# Patient Record
Sex: MALE | Race: AMERICAN INDIAN OR ALASKA NATIVE
[De-identification: names, ages, dates, MRNs, and addresses within clinical notes are randomized per-mention and may not be internally consistent; named-entity substitution may affect disease eponyms.]

---

## 2019-04-30 ENCOUNTER — HOSPITAL ENCOUNTER (EMERGENCY)
Dept: HOSPITAL 43 - DL.ED | Age: 34
Discharge: HOME | End: 2019-04-30
Payer: MEDICAID
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
## 2019-04-30 DIAGNOSIS — S60.221A: Primary | ICD-10-CM

## 2019-04-30 NOTE — EDM.PDOC
ED HPI GENERAL MEDICAL PROBLEM





- General


Chief Complaint: Upper Extremity Injury/Pain


Stated Complaint: DISLOCATED FINGERS RT HAND [id number]


Time Seen by Provider: 04/30/19 23:10


Source of Information: Reports: Patient


History Limitations: Reports: No Limitations





- History of Present Illness


INITIAL COMMENTS - FREE TEXT/NARRATIVE: 





C/o right hand pain  reports bumped hand on pole at work on Monday, felt like 

it dislocated or broke. Has not been seen prior to tonight .  [employer]played at [employer]





- Related Data


 Allergies











Allergy/AdvReac Type Severity Reaction Status Date / Time


 


No Known Allergies Allergy   Verified 04/30/19 22:50











Home Meds: 


 Home Meds





. [No Known Home Meds]  04/30/19 [History]











Past Medical History





- Past Surgical History


GI Surgical History: Reports: Appendectomy


Musculoskeletal Surgical History: Reports: Other (See Below)


Other Musculoskeletal Surgeries/Procedures:: hand surgery





Social & Family History





- Tobacco Use


Smoking Status *Q: Never Smoker


Second Hand Smoke Exposure: No





- Recreational Drug Use


Recreational Drug Use: Yes


Drug Use in Last 12 Months: Yes


Recreational Drug Type: Reports: Marijuana/Hashish





Review of Systems





- Review of Systems


Review Of Systems: See Below


Constitutional: Reports: No Symptoms


Eyes: Reports: No Symptoms


Ears: Reports: No Symptoms


Nose: Reports: No Symptoms


Mouth/Throat: Reports: No Symptoms


Respiratory: Reports: No Symptoms


GI/Abdominal: Reports: No Symptoms


Musculoskeletal: Reports: Hand Pain (right)


Skin: Reports: No Symptoms


Neurological: Reports: No Symptoms





ED EXAM, GENERAL





- Physical Exam


Exam: See Below


Exam Limited By: No Limitations


General Appearance: Alert, Anxious


Eye Exam: Bilateral Eye: PERRL


Ears: Normal External Exam, Hearing Grossly Normal


Nose: Normal Inspection


Throat/Mouth: Normal Inspection


Head: Atraumatic, Normocephalic


Neck: Normal Inspection


Respiratory/Chest: No Respiratory Distress


Cardiovascular: Normal Peripheral Pulses, Regular Rate, Rhythm


Extremities: Other (right hand mild generalized sweling anterior, no redness no 

bruising generalized tenderness greater 4th and 5th MCP)


Neurological: Alert, Oriented, Inattentive


Psychiatric: Anxious (rapid speech, )


Skin Exam: Warm, Dry, Other (multiple scattered small 3-5 mm abrasion ot palm 

fingers and MCP and hand. )





Course





- Vital Signs


Last Recorded V/S: 


 Last Vital Signs











Temp  97.2 F   04/30/19 22:47


 


Pulse  116 H  04/30/19 22:47


 


Resp  18   04/30/19 22:47


 


BP  153/83 H  04/30/19 22:47


 


Pulse Ox  99   04/30/19 22:47














- Radiology Interpretation


Free Text/Narrative:: 





Xray right hand negative





Departure





- Departure


Time of Disposition: 23:17


Disposition: Home, Self-Care 01


Condition: Good


Clinical Impression: 


Contusion of right hand


Qualifiers:


 Encounter type: initial encounter Qualified Code(s): S60.221A - Contusion of 

right hand, initial encounter








- Discharge Information


*PRESCRIPTION DRUG MONITORING PROGRAM REVIEWED*: No


*COPY OF PRESCRIPTION DRUG MONITORING REPORT IN PATIENT CRESENCIO: No


Instructions:  Hand Contusion, Easy-to-Read


Referrals: 


PCP,None [Primary Care Provider] - 


Forms:  ED Department Discharge


Additional Instructions: 


elevate


ace wrap for comfort


alternate tylenol and ibuprofen for discomfort every 4 hours as needed


follow up if symptoms worsen

## 2019-09-09 NOTE — PCM.HP
H&P History of Present Illness





- General


Date of Service: 09/09/19


Admit Problem/Dx: 


 Admission Diagnosis/Problem





Admission Diagnosis/Problem      Cellulitis and abscess of face








Source of Information: Patient, Provider





- History of Present Illness


Initial Comments - Free Text/Narative: 





34-year-old with history of pre-diabetes


He is using methamphetamine recreationally, daily marijuana.


He is not working, staying at home with his 4-year-old daughter.





He developed left sided facial swelling associated with drainage from in front 

of the left ureter. There is moderate to severe pain associated with this. 

Worse with touching. Associated redness.





Did not measure her temperature but has a general "ill" feeling.





No chest pain, no abdominal pain.





Last amphetamine use was the day before admission.











- Related Data


Allergies/Adverse Reactions: 


 Allergies











Allergy/AdvReac Type Severity Reaction Status Date / Time


 


No Known Allergies Allergy   Verified 04/30/19 22:50











Home Medications: 


 Home Meds





. [No Known Home Meds]  04/30/19 [History]











Past Medical History


HEENT History: Reports: Hard of Hearing, Impaired Vision


Endocrine/Metabolic History: Reports: Obesity/BMI 30+





- Past Surgical History


GI Surgical History: Reports: Appendectomy


Musculoskeletal Surgical History: Reports: Other (See Below)


Other Musculoskeletal Surgeries/Procedures:: hand surgery





Social & Family History





- Family History


Family Medical History: Noncontributory





- Caffeine Use


Caffeine Use: Reports: Coffee, Soda





- Recreational Drug Use


Recreational Drug Use: Yes


Drug Use in Last 12 Months: Yes


Recreational Drug Type: Reports: Marijuana/Hashish, Methamphetamine





H&P Review of Systems





- Review of Systems:


Review Of Systems: See Below


General: Reports: Chills, Malaise.  Denies: Fever


HEENT: Reports: Other (Left facial swelling and redness between the left eye 

and left ear lobe)


Pulmonary: Denies: Shortness of Breath


Cardiovascular: Denies: Chest Pain, Edema


Musculoskeletal: Reports: No Symptoms


Skin: Reports: Other (Left facial swelling and redness)


Neurological: Reports: Other (No vision change, no double vision).  Denies: 

Confusion





Exam





- Exam


Exam: See Below





- Vital Signs


Vital Signs: 


 Last Vital Signs











Temp  37.9 C   09/09/19 11:45


 


Pulse  114 H  09/09/19 11:45


 


Resp  16   09/09/19 11:45


 


BP  154/90 H  09/09/19 11:45


 


Pulse Ox      











Weight: 100.516 kg





- Exam


General: Alert, Oriented


HEENT: Conjunctiva Clear, EOMI, Hearing Intact, Other (Swelling in the left 

parotid area from the external ear to I)


Lungs: Clear to Auscultation


Cardiovascular: Regular Rate, Regular Rhythm


GI/Abdominal Exam: Normal Bowel Sounds, Soft, Non-Tender


Extremities: No Pedal Edema


Skin: Warm, Other (Swelling and redness of left facial area)


Neuro Extensive - Mental Status: Alert, Oriented x3, Other (Somewhat jittery)


Psychiatric: Alert, Anxious





- Patient Data


Lab Results Last 24 hrs: 


 Laboratory Results - last 24 hr











  09/09/19 09/09/19 09/09/19 Range/Units





  12:08 12:08 12:15 


 


WBC  17.2 H    (5.0-10.0)  10^3/uL


 


RBC  5.20    (4.6-6.2)  10^6/uL


 


Hgb  15.6    (14.0-18.0)  g/dL


 


Hct  44.7    (40.0-54.0)  %


 


MCV  86.0    ()  fL


 


MCH  30.0    (27.0-34.0)  pg


 


MCHC  34.9    (33.0-35.0)  g/dL


 


Plt Count  376    (150-450)  10^3/uL


 


Neut % (Auto)  81.8 H    (42.2-75.2)  %


 


Lymph % (Auto)  9.3 L    (20.5-50.1)  %


 


Mono % (Auto)  6.1    (2-8)  %


 


Eos % (Auto)  2.6    (1.0-3.0)  %


 


Baso % (Auto)  0.2    (0.0-1.0)  %


 


Sodium    134 L  (135-145)  mmol/L


 


Potassium    4.3  (3.6-5.0)  mmol/L


 


Chloride    102  (101-111)  mmol/L


 


Carbon Dioxide    25.0  (21.0-31.0)  mmol/L


 


Anion Gap    11.3  


 


BUN    13  (7-18)  mg/dL


 


Creatinine    0.7  (0.6-1.3)  mg/dL


 


Est Cr Clr Drug Dosing    134.18  mL/min


 


Estimated GFR (MDRD)    > 60  


 


BUN/Creatinine Ratio    18.57  


 


Glucose    99  ()  mg/dL


 


Lactic Acid   1.4   (0.5-2.2)  mmol/L


 


Calcium    8.9  (8.4-10.2)  mg/dl


 


Total Bilirubin    0.4  (0.2-1.0)  mg/dL


 


AST    71 H  (10-42)  IU/L


 


ALT    80 H  (10-60)  IU/L


 


Alkaline Phosphatase    106  ()  IU/L


 


C-Reactive Protein     (0.0-1.3)  mg/dL


 


Total Protein    7.5  (6.7-8.2)  g/dl


 


Albumin    4.0  (3.2-5.5)  g/dl


 


Globulin    3.5  


 


Albumin/Globulin Ratio    1.14  














  09/09/19 Range/Units





  12:15 


 


WBC   (5.0-10.0)  10^3/uL


 


RBC   (4.6-6.2)  10^6/uL


 


Hgb   (14.0-18.0)  g/dL


 


Hct   (40.0-54.0)  %


 


MCV   ()  fL


 


MCH   (27.0-34.0)  pg


 


MCHC   (33.0-35.0)  g/dL


 


Plt Count   (150-450)  10^3/uL


 


Neut % (Auto)   (42.2-75.2)  %


 


Lymph % (Auto)   (20.5-50.1)  %


 


Mono % (Auto)   (2-8)  %


 


Eos % (Auto)   (1.0-3.0)  %


 


Baso % (Auto)   (0.0-1.0)  %


 


Sodium   (135-145)  mmol/L


 


Potassium   (3.6-5.0)  mmol/L


 


Chloride   (101-111)  mmol/L


 


Carbon Dioxide   (21.0-31.0)  mmol/L


 


Anion Gap   


 


BUN   (7-18)  mg/dL


 


Creatinine   (0.6-1.3)  mg/dL


 


Est Cr Clr Drug Dosing   mL/min


 


Estimated GFR (MDRD)   


 


BUN/Creatinine Ratio   


 


Glucose   ()  mg/dL


 


Lactic Acid   (0.5-2.2)  mmol/L


 


Calcium   (8.4-10.2)  mg/dl


 


Total Bilirubin   (0.2-1.0)  mg/dL


 


AST   (10-42)  IU/L


 


ALT   (10-60)  IU/L


 


Alkaline Phosphatase   ()  IU/L


 


C-Reactive Protein  4.1 H  (0.0-1.3)  mg/dL


 


Total Protein   (6.7-8.2)  g/dl


 


Albumin   (3.2-5.5)  g/dl


 


Globulin   


 


Albumin/Globulin Ratio   











Result Diagrams: 


 09/09/19 12:08





 09/09/19 12:15


Imaging Impressions Last 24 hrs: 





CT facial


IMPRESSION:


1. Findings consistent with left periauricular cellulitis as well as left 

parotiditis.


2. There is a left preauricular soft tissue abscess measuring 1.2 x 1.1 cm.


3. Significant chronic maxillary premolar dental disease, could be a 

predisposing risk factor for


parotiditis.








- Problem List


(1) Facial cellulitis


SNOMED Code(s): 914628706


   ICD Code: L03.211 - CELLULITIS OF FACE   Status: Acute   Current Visit: No   


Problem List Initiated/Reviewed/Updated: Yes


Orders Last 24hrs: 


 Active Orders 24 hr











 Category Date Time Status


 


 Admission Diagnosis [ADT] Routine ADT  09/09/19 13:52 Ordered


 


 Patient Status [ADT] Routine ADT  09/09/19 13:52 Active


 


 Oxygen Therapy [RC] PRN Care  09/09/19 14:25 Ordered


 


 Peripheral IV Care [RC] .AS DIRECTED Care  09/09/19 11:58 Active


 


 Up With Assistance [RC] ASDIRECTED Care  09/09/19 14:25 Ordered


 


 VTE/DVT Education [RC] PER UNIT ROUTINE Care  09/09/19 14:25 Ordered


 


 Vital Signs [RC] Q4H Care  09/09/19 14:25 Ordered


 


 Regular Diet [DIET] Diet  09/09/19 Dinner Ordered


 


 BASIC METABOLIC PANEL,BMP [CHEM] AM Lab  09/10/19 05:15 Ordered


 


 CBC WITH AUTO DIFF [HEME] AM Lab  09/10/19 05:15 Ordered


 


 CULTURE BLOOD [BC] Stat Lab  09/09/19 12:08 Received


 


 CULTURE BLOOD [BC] Stat Lab  09/09/19 12:15 Received


 


 CULTURE EAR [RM] Stat Lab  09/09/19 11:55 Received


 


 CULTURE EYE [RM] Stat Lab  09/09/19 11:54 Received


 


 Acetaminophen [Tylenol] Med  09/09/19 14:25 Ordered





 650 mg PO Q4H PRN   


 


 Acetaminophen/oxyCODONE [Percocet 325-5 MG] Med  09/09/19 14:25 Ordered





 1 tab PO Q4H PRN   


 


 Gentamicin [Garamycin 0.3% Ophth Soln] Med  09/09/19 21:00 Ordered





 1 ml EYELF TID   


 


 Heparin Sodium Med  09/09/19 22:00 Ordered





 5,000 units SUBCUT Q8HR   


 


 Ibuprofen [Motrin] Med  09/09/19 14:25 Ordered





 600 mg PO Q6H PRN   


 


 Ondansetron [Zofran ODT] Med  09/09/19 14:25 Ordered





 4 mg PO Q6H PRN   


 


 Ondansetron [Zofran] Med  09/09/19 14:25 Ordered





 4 mg IVPUSH Q6H PRN   


 


 Pharmacy to Dose - Vancomycin Med  09/09/19 14:30 Ordered





 1 dose .XX ASDIRECTED   


 


 Piperacillin/Tazobactam [Zosyn] 3.375 gm Med  09/09/19 19:00 Ordered





 Sodium Chloride 0.9% [Normal Saline] 100 ml   





 IV Q6H   


 


 Sodium Chloride 0.9% [Saline Flush] Med  09/09/19 11:58 Active





 10 ml FLUSH ASDIRECTED PRN   


 


 Zolpidem [Ambien] Med  09/09/19 14:25 Ordered





 5 mg PO BEDTIME PRN   


 


 Blood Culture x2 Reflex Set [OM.PC] Stat Ot  09/09/19 11:58 Ordered


 


 Peripheral IV Insertion Adult [OM.PC] Stat Ot  09/09/19 11:57 Ordered


 


 Resuscitation Status Routine Resus Stat  09/09/19 14:25 Ordered








 Medication Orders





Acetaminophen (Tylenol)  650 mg PO Q4H PRN


   PRN Reason: Pain (Mild 1-3)/fever


Gentamicin Sulfate (Garamycin 0.3% Ophth Soln)  1 ml EYELF TID CHARLES


Heparin Sodium (Porcine) (Heparin Sodium)  5,000 units SUBCUT Q8HR CHARLES


Piperacillin Sod/Tazobactam (Sod 3.375 gm/ Sodium Chloride)  100 mls @ 200 mls/

hr IV Q6HR CHARLES


Ibuprofen (Motrin)  600 mg PO Q6H PRN


   PRN Reason: Pain (moderate 4-6)


Ondansetron HCl (Zofran)  4 mg IVPUSH Q6H PRN


   PRN Reason: Nausea/Vomiting


Ondansetron HCl (Zofran Odt)  4 mg PO Q6H PRN


   PRN Reason: nausea, able to take PO


Oxycodone/Acetaminophen (Percocet 325-5 Mg)  1 tab PO Q4H PRN


   PRN Reason: Pain (severe 7-10)


Sodium Chloride (Saline Flush)  10 ml FLUSH ASDIRECTED PRN


   PRN Reason: Keep Vein Open


   Last Admin: 09/09/19 12:08  Dose: 10 ml


Vancomycin HCl (Pharmacy To Dose - Vancomycin)  1 dose .XX ASDIRECTED CHARLES


Zolpidem Tartrate (Ambien)  5 mg PO BEDTIME PRN


   PRN Reason: Sleep








Assessment/Plan Comment:: 








44-year-old presented with left facial redness, swelling, 1 x 1 cm abscess





Facial cellulitis with small abscess


At this point the abscess probably can be treated with medications


We'll monitor closely for needs surgical drainage


Obtain blood culture, culture from drainage


Treat empirically with vancomycin and Zosyn





History of drug use with amphetamine, marijuana


Follow for potential withdrawal symptoms





History of present diabetes


We'll check fasting sugar in the morning








DVT prophylaxis with subcutaneous heparin

## 2019-09-09 NOTE — EDM.PDOC
ED HPI GENERAL MEDICAL PROBLEM





- General


Chief Complaint: ENT Problem


Stated Complaint: INFECTION


Time Seen by Provider: 19 11:57


Source of Information: Reports: Patient, RN, RN Notes Reviewed


History Limitations: Reports: No Limitations





- History of Present Illness


INITIAL COMMENTS - FREE TEXT/NARRATIVE: 


Pt presents to ER from home with c/o waking with swelling and pain to the left 

face from the eye to the ear. Pt states he was seen in clinic and given 

Erythromycin eye ointment for an eye infection but it did not improve. This 

morning when he woke he notice purulent drainage from the skin just in front of 

the left ear, increased eye pain and swelling, and redness with swelling of the 

left external ear and area around the ear. Pt isn't sure if he has had any 

fevers or not. He admits to feeling a generalized "ill" feeling. Pt does not 

know if he has ever had a MRSA infection or not.





Onset: Gradual


Duration: Constant, Getting Worse


Location: Reports: Face


Quality: Reports: Ache, Pressure, Throbbing


Severity: Moderate


Improves with: Reports: None


Worsens with: Reports: None


Associated Symptoms: Reports: No Other Symptoms


Treatments PTA: Reports: Other Medication(s) (Erythromycin Ophthalmic Ointment)





- Related Data


 Allergies











Allergy/AdvReac Type Severity Reaction Status Date / Time


 


No Known Allergies Allergy   Verified 19 22:50











Home Meds: 


 Home Meds





. [No Known Home Meds]  19 [History]











Past Medical History


HEENT History: Reports: Hard of Hearing, Impaired Vision


Endocrine/Metabolic History: Reports: Obesity/BMI 30+





- Past Surgical History


GI Surgical History: Reports: Appendectomy


Musculoskeletal Surgical History: Reports: Other (See Below)


Other Musculoskeletal Surgeries/Procedures:: hand surgery





Social & Family History





- Family History


Family Medical History: Noncontributory





ED ROS ENT





- Review of Systems


Review Of Systems: ROS reveals no pertinent complaints other than HPI.





ED EXAM, ENT





- Physical Exam


Exam: See Below


Exam Limited By: No Limitations


General Appearance: Alert, No Apparent Distress, Obese


Eye Exam: Left Eye: Conjunctival Injection, Periorbital Changes (mild swelling/

inflammation with erythema), Bilateral Eye: EOMI, Normal Fundi, PERRL


Ears: Hearing Loss (Chronic/Stable per pt.), Auricular Erythema (Left), 

Auricular Tenderness (and Pre-auricular with swelling, skin/soft tissue abscess 

with purulent drainage), Canal Swelling (Left).  No: Mastoid Swelling, Mastoid 

Tenderness


Nose: No Blood, Other (Excoriation/scabbing at B/L nares, suspicious for MRSA)


Mouth/Throat: Normal Inspection, Normal Lips, Normal Oropharynx


Head: Atraumatic, Facial Swelling (Left face from left eye to left ear, 

swelling is confined to the upper face, left side only), Facial Tenderness (

with erythema)


Neck: Supple, Non-Tender, Full Range of Motion, Lymphadenopathy (L), Other (No 

nuchal rigidity).  No: Lymphadenopathy (R)


Respiratory/Chest: No Respiratory Distress, Lungs Clear, Normal Breath Sounds, 

No Accessory Muscle Use, Chest Non-Tender


Cardiovascular: Regular Rate, Rhythm, Tachycardia


Extremities: Normal Inspection, Normal Range of Motion, No Pedal Edema.  No: 

Joint Swelling, Increased Warmth, Redness


Neurological: Alert, Oriented, CN II-XII Intact, Normal Cognition, No Motor/

Sensory Deficits


Psychiatric: Normal Mood





Course





- Vital Signs


Last Recorded V/S: 


 Last Vital Signs











Temp  100.2 F   19 11:45


 


Pulse  114 H  19 11:45


 


Resp  16   19 11:45


 


BP  154/90 H  19 11:45


 


Pulse Ox      














- Orders/Labs/Meds


Orders: 


 Active Orders 24 hr











 Category Date Time Status


 


 Peripheral IV Care [RC] .AS DIRECTED Care  19 11:58 Active


 


 CULTURE BLOOD [BC] Stat Lab  19 12:08 Received


 


 CULTURE BLOOD [BC] Stat Lab  19 12:15 Received


 


 CULTURE EAR [RM] Stat Lab  19 11:55 Received


 


 CULTURE EYE [RM] Stat Lab  19 11:54 Received


 


 Piperacillin/Tazobactam [Zosyn] 3.375 gm Med  19 13:35 Active





 Sodium Chloride 0.9% [Normal Saline] 100 ml   





 IV ONETIME   


 


 Sodium Chloride 0.9% [Saline Flush] Med  19 11:58 Active





 10 ml FLUSH ASDIRECTED PRN   


 


 Blood Culture x2 Reflex Set [OM.PC] Stat Oth  19 11:58 Ordered


 


 Peripheral IV Insertion Adult [OM.PC] Stat Oth  19 11:57 Ordered








 Medication Orders





Piperacillin Sod/Tazobactam (Sod 3.375 gm/ Sodium Chloride)  100 mls @ 200 mls/

hr IV ONETIME ONE


   Stop: 19 14:04


Sodium Chloride (Saline Flush)  10 ml FLUSH ASDIRECTED PRN


   PRN Reason: Keep Vein Open


   Last Admin: 19 12:08  Dose: 10 ml








Labs: 


 Laboratory Tests











  19 Range/Units





  12:08 12:08 12:15 


 


WBC  17.2 H    (5.0-10.0)  10^3/uL


 


RBC  5.20    (4.6-6.2)  10^6/uL


 


Hgb  15.6    (14.0-18.0)  g/dL


 


Hct  44.7    (40.0-54.0)  %


 


MCV  86.0    ()  fL


 


MCH  30.0    (27.0-34.0)  pg


 


MCHC  34.9    (33.0-35.0)  g/dL


 


Plt Count  376    (150-450)  10^3/uL


 


Neut % (Auto)  81.8 H    (42.2-75.2)  %


 


Lymph % (Auto)  9.3 L    (20.5-50.1)  %


 


Mono % (Auto)  6.1    (2-8)  %


 


Eos % (Auto)  2.6    (1.0-3.0)  %


 


Baso % (Auto)  0.2    (0.0-1.0)  %


 


Sodium    134 L  (135-145)  mmol/L


 


Potassium    4.3  (3.6-5.0)  mmol/L


 


Chloride    102  (101-111)  mmol/L


 


Carbon Dioxide    25.0  (21.0-31.0)  mmol/L


 


Anion Gap    11.3  


 


BUN    13  (7-18)  mg/dL


 


Creatinine    0.7  (0.6-1.3)  mg/dL


 


Est Cr Clr Drug Dosing    134.18  mL/min


 


Estimated GFR (MDRD)    > 60  


 


BUN/Creatinine Ratio    18.57  


 


Glucose    99  ()  mg/dL


 


Lactic Acid   1.4   (0.5-2.2)  mmol/L


 


Calcium    8.9  (8.4-10.2)  mg/dl


 


Total Bilirubin    0.4  (0.2-1.0)  mg/dL


 


AST    71 H  (10-42)  IU/L


 


ALT    80 H  (10-60)  IU/L


 


Alkaline Phosphatase    106  ()  IU/L


 


C-Reactive Protein     (0.0-1.3)  mg/dL


 


Total Protein    7.5  (6.7-8.2)  g/dl


 


Albumin    4.0  (3.2-5.5)  g/dl


 


Globulin    3.5  


 


Albumin/Globulin Ratio    1.14  














  19 Range/Units





  12:15 


 


WBC   (5.0-10.0)  10^3/uL


 


RBC   (4.6-6.2)  10^6/uL


 


Hgb   (14.0-18.0)  g/dL


 


Hct   (40.0-54.0)  %


 


MCV   ()  fL


 


MCH   (27.0-34.0)  pg


 


MCHC   (33.0-35.0)  g/dL


 


Plt Count   (150-450)  10^3/uL


 


Neut % (Auto)   (42.2-75.2)  %


 


Lymph % (Auto)   (20.5-50.1)  %


 


Mono % (Auto)   (2-8)  %


 


Eos % (Auto)   (1.0-3.0)  %


 


Baso % (Auto)   (0.0-1.0)  %


 


Sodium   (135-145)  mmol/L


 


Potassium   (3.6-5.0)  mmol/L


 


Chloride   (101-111)  mmol/L


 


Carbon Dioxide   (21.0-31.0)  mmol/L


 


Anion Gap   


 


BUN   (7-18)  mg/dL


 


Creatinine   (0.6-1.3)  mg/dL


 


Est Cr Clr Drug Dosing   mL/min


 


Estimated GFR (MDRD)   


 


BUN/Creatinine Ratio   


 


Glucose   ()  mg/dL


 


Lactic Acid   (0.5-2.2)  mmol/L


 


Calcium   (8.4-10.2)  mg/dl


 


Total Bilirubin   (0.2-1.0)  mg/dL


 


AST   (10-42)  IU/L


 


ALT   (10-60)  IU/L


 


Alkaline Phosphatase   ()  IU/L


 


C-Reactive Protein  4.1 H  (0.0-1.3)  mg/dL


 


Total Protein   (6.7-8.2)  g/dl


 


Albumin   (3.2-5.5)  g/dl


 


Globulin   


 


Albumin/Globulin Ratio   











Meds: 


Medications











Generic Name Dose Route Start Last Admin





  Trade Name Freq  PRN Reason Stop Dose Admin


 


Piperacillin Sod/Tazobactam  100 mls @ 200 mls/hr  19 13:35  





  Sod 3.375 gm/ Sodium Chloride  IV  19 14:04  





  ONETIME ONE   





     





     





     





     


 


Sodium Chloride  10 ml  19 11:58  19 12:08





  Saline Flush  FLUSH   10 ml





  ASDIRECTED PRN   Administration





  Keep Vein Open   





     





     





     














Discontinued Medications














Generic Name Dose Route Start Last Admin





  Trade Name Freq  PRN Reason Stop Dose Admin


 


Diphenhydramine HCl  25 mg  19 11:58  19 12:28





  Benadryl  IVPUSH  19 11:59  25 mg





  ONETIME ONE   Administration





     





     





     





     


 


Gentamicin Sulfate  1 ml  19 12:04  19 12:26





  Garamycin 0.3% Ophth Soln  EYELF  19 12:05  1 ml





  ONETIME ONE   Administration





     





     





     





     


 


Sodium Chloride  1,000 mls @ 999 mls/hr  19 11:58  19 12:47





  Normal Saline  IV  19 12:58  999 mls/hr





  .BOLUS ONE   Administration





     





     





     





     


 


Vancomycin HCl 1,500 mg/  500 mls @ 333.333 mls/hr  19 11:59  19 12:

28





  Sodium Chloride  IV  19 13:28  333.333 mls/hr





  ONETIME ONE   Administration





     





     





     





     


 


Iopamidol  100 ml  19 12:16  19 12:45





  Isovue-300 (61%)  IVPUSH  19 12:17  100 ml





  ONETIME ONE   Administration





     





     





     





     


 


Mupirocin  15 gm  19 12:04  19 12:24





  Bactroban Oint  TOP  19 12:05  15 gm





  ONETIME ONE   Administration





     





     





     





     














- Radiology Interpretation


Free Text/Narrative:: 


[facility]


Final Radiology Report  Call: [phone]


assistance Online chat: [email or web address]


Name: NINFA GRANADO Age: 34Years M Date: 2019


MRN: [id number] SSN: -- : [id number]


Study: CT MAXILLOFACIAL/SINUSES W Requesting Physician: NED BHANDARI


Accession: [id number] Images: [id number]


Addl Studies:


Provided Clinical History: Swelling L face, eye to ear, erythema. Purulent 

drainage from skin at pre-auricular left ear


Contrast: With Contrast Medium: Isovue 300


Contrast Amount: 100 mL Contrast Method: RLA


Page 1 of 2


EXAM:


CT Maxillofacial With Contrast


EXAM DATE/TIME:


2019 12:44 PM


CLINICAL HISTORY:


34 years old, male; Other: Swelling L face, eye to ear, erythema; Additional 

info: Swelling L face, eye


to ear, erythema. Purulent drainage from skin at pre-auricular left ear


TECHNIQUE:


Imaging protocol: Computed tomography images of the face with intravenous 

contrast.


Radiation optimization: All CT scans at this facility use at least one of these 

dose optimization


techniques: automated exposure control; mA and/or kV adjustment per patient 

size (includes targeted


exams where dose is matched to clinical indication); or iterative 

reconstruction.


Contrast material: ISOVUE 300; Contrast volume: 100 ml; Contrast route: RLA;


COMPARISON:


No relevant prior studies available.


FINDINGS:


Orbits: No post septal orbital inflammatory changes.


Sinuses: Mild sinus mucosal thickening. No air-fluid levels.


Bones/joints: No acute fracture or periostitis seen.


Dental: There are maxillary premolar dental caries with periapical lucencies 

consistent with


endodontal disease.


Lymph nodes: Mildly enlarged left submandibular, periparotid and intraparotid 

lymph nodes, likely


reactive.


Submandibular/Parotid glands: The left parotid gland appears enlarged and 

inflamed consistent


with parotiditis.


NINFA GRANADO Accession: [id number] MRN:[id number] | Final Radiology Report


CONFIDENTIALITY STATEMENT


This report is intended only for use by the referring physician, and only in 

accordance with law. If you received this in error, call [phone].


Page 2 of 2


Soft tissues: There is left auricular and periauricular soft tissue edema 

consistent with cellulitis. A left


preauricular soft tissue abscess measuring 1.2 x 1.1 cm on image 8 of series 5.


IMPRESSION:


1. Findings consistent with left periauricular cellulitis as well as left 

parotiditis.


2. There is a left preauricular soft tissue abscess measuring 1.2 x 1.1 cm.


3. Significant chronic maxillary premolar dental disease, could be a 

predisposing risk factor for


parotiditis.


Thank you for allowing us to participate in the care of your patient.


Dictated and Authenticated by: Catherine Garcia MD


2019 1:34 PM Central Time ([place] & [place])








Departure





- Departure


Time of Disposition: 13:52 (admitted to Dr. Smiley)


Disposition: Admitted As Inpatient 66


Condition: Fair


Clinical Impression: 


 Facial cellulitis








- Discharge Information


*PRESCRIPTION DRUG MONITORING PROGRAM REVIEWED*: No


*COPY OF PRESCRIPTION DRUG MONITORING REPORT IN PATIENT CRESENCIO: No


Forms:  ED Department Discharge





- My Orders


Last 24 Hours: 


My Active Orders





19 11:54


CULTURE EYE [RM] Stat 





19 11:55


CULTURE EAR [RM] Stat 





19 11:57


Peripheral IV Insertion Adult [OM.PC] Stat 





19 11:58


Peripheral IV Care [RC] .AS DIRECTED 


Sodium Chloride 0.9% [Saline Flush]   10 ml FLUSH ASDIRECTED PRN 


Blood Culture x2 Reflex Set [OM.PC] Stat 





19 12:08


CULTURE BLOOD [BC] Stat 





19 12:15


CULTURE BLOOD [BC] Stat 





19 13:35


Piperacillin/Tazobactam [Zosyn] 3.375 gm   Sodium Chloride 0.9% [Normal Saline] 

100 ml IV ONETIME 














- Assessment/Plan


Last 24 Hours: 


My Active Orders





19 11:54


CULTURE EYE [RM] Stat 





19 11:55


CULTURE EAR [RM] Stat 





19 11:57


Peripheral IV Insertion Adult [OM.PC] Stat 





19 11:58


Peripheral IV Care [RC] .AS DIRECTED 


Sodium Chloride 0.9% [Saline Flush]   10 ml FLUSH ASDIRECTED PRN 


Blood Culture x2 Reflex Set [OM.PC] Stat 





19 12:08


CULTURE BLOOD [BC] Stat 





19 12:15


CULTURE BLOOD [BC] Stat 





19 13:35


Piperacillin/Tazobactam [Zosyn] 3.375 gm   Sodium Chloride 0.9% [Normal Saline] 

100 ml IV ONETIME

## 2019-09-10 NOTE — PCM.PN
- General Info


Date of Service: 09/10/19


Admission Dx/Problem (Free Text): 


 Admission Diagnosis/Problem





Admission Diagnosis/Problem      Cellulitis and abscess of face








Subjective Update: 





Continues to have left facial swelling and discomfort associated with this. 

Started prior to admission.


Moderate to severe degree. There is an area of a small about 1 cm abscess that 

is draining anterior to the external ear canal.


Good drainage is coming.


Tolerating antibiotics. Tolerating pain.


Functional Status: Reports: Pain Controlled, Tolerating Diet, Ambulating





- Review of Systems


General: Denies: Fever


HEENT: Reports: Other (Swelling on the left side)


Pulmonary: Denies: Shortness of Breath


Cardiovascular: Denies: Chest Pain


Gastrointestinal: Denies: Abdominal Pain


Genitourinary: Denies: Dysuria


Neurological: Denies: Confusion





- Patient Data


Vitals - Most Recent: 


 Last Vital Signs











Temp  37.0 C   09/10/19 07:00


 


Pulse  76   09/10/19 07:00


 


Resp  20   09/10/19 07:00


 


BP  116/69   09/10/19 07:00


 


Pulse Ox  99   09/10/19 07:00











Weight - Most Recent: 100.516 kg


I&O - Last 24 Hours: 


 Intake & Output











 09/09/19 09/10/19 09/10/19





 22:59 06:59 14:59


 


Intake Total 1270 450 440


 


Balance 1270 450 440











Lab Results Last 24 Hours: 


 Laboratory Results - last 24 hr











  09/09/19 09/09/19 09/09/19 Range/Units





  12:08 12:08 12:15 


 


WBC  17.2 H    (5.0-10.0)  10^3/uL


 


RBC  5.20    (4.6-6.2)  10^6/uL


 


Hgb  15.6    (14.0-18.0)  g/dL


 


Hct  44.7    (40.0-54.0)  %


 


MCV  86.0    ()  fL


 


MCH  30.0    (27.0-34.0)  pg


 


MCHC  34.9    (33.0-35.0)  g/dL


 


Plt Count  376    (150-450)  10^3/uL


 


Neut % (Auto)  81.8 H    (42.2-75.2)  %


 


Lymph % (Auto)  9.3 L    (20.5-50.1)  %


 


Mono % (Auto)  6.1    (2-8)  %


 


Eos % (Auto)  2.6    (1.0-3.0)  %


 


Baso % (Auto)  0.2    (0.0-1.0)  %


 


Sodium    134 L  (135-145)  mmol/L


 


Potassium    4.3  (3.6-5.0)  mmol/L


 


Chloride    102  (101-111)  mmol/L


 


Carbon Dioxide    25.0  (21.0-31.0)  mmol/L


 


Anion Gap    11.3  


 


BUN    13  (7-18)  mg/dL


 


Creatinine    0.7  (0.6-1.3)  mg/dL


 


Est Cr Clr Drug Dosing    134.18  mL/min


 


Estimated GFR (MDRD)    > 60  


 


BUN/Creatinine Ratio    18.57  


 


Glucose    99  ()  mg/dL


 


Lactic Acid   1.4   (0.5-2.2)  mmol/L


 


Calcium    8.9  (8.4-10.2)  mg/dl


 


Total Bilirubin    0.4  (0.2-1.0)  mg/dL


 


AST    71 H  (10-42)  IU/L


 


ALT    80 H  (10-60)  IU/L


 


Alkaline Phosphatase    106  ()  IU/L


 


C-Reactive Protein     (0.0-1.3)  mg/dL


 


Total Protein    7.5  (6.7-8.2)  g/dl


 


Albumin    4.0  (3.2-5.5)  g/dl


 


Globulin    3.5  


 


Albumin/Globulin Ratio    1.14  














  09/09/19 09/10/19 09/10/19 Range/Units





  12:15 05:50 05:50 


 


WBC   12.4 H   (5.0-10.0)  10^3/uL


 


RBC   4.88   (4.6-6.2)  10^6/uL


 


Hgb   14.7   (14.0-18.0)  g/dL


 


Hct   42.8   (40.0-54.0)  %


 


MCV   87.7   ()  fL


 


MCH   30.1   (27.0-34.0)  pg


 


MCHC   34.3   (33.0-35.0)  g/dL


 


Plt Count   337   (150-450)  10^3/uL


 


Neut % (Auto)   70.6   (42.2-75.2)  %


 


Lymph % (Auto)   16.1 L   (20.5-50.1)  %


 


Mono % (Auto)   7.0   (2-8)  %


 


Eos % (Auto)   6.0 H   (1.0-3.0)  %


 


Baso % (Auto)   0.3   (0.0-1.0)  %


 


Sodium    138  (135-145)  mmol/L


 


Potassium    3.8  (3.6-5.0)  mmol/L


 


Chloride    102  (101-111)  mmol/L


 


Carbon Dioxide    27.0  (21.0-31.0)  mmol/L


 


Anion Gap    12.8  


 


BUN    6 L  (7-18)  mg/dL


 


Creatinine    0.9  (0.6-1.3)  mg/dL


 


Est Cr Clr Drug Dosing    104.36  mL/min


 


Estimated GFR (MDRD)    > 60  


 


BUN/Creatinine Ratio     


 


Glucose    103  ()  mg/dL


 


Lactic Acid     (0.5-2.2)  mmol/L


 


Calcium    8.5  (8.4-10.2)  mg/dl


 


Total Bilirubin     (0.2-1.0)  mg/dL


 


AST     (10-42)  IU/L


 


ALT     (10-60)  IU/L


 


Alkaline Phosphatase     ()  IU/L


 


C-Reactive Protein  4.1 H    (0.0-1.3)  mg/dL


 


Total Protein     (6.7-8.2)  g/dl


 


Albumin     (3.2-5.5)  g/dl


 


Globulin     


 


Albumin/Globulin Ratio     











Da Results Last 24 Hours: 


 Microbiology











 09/09/19 11:55 Ear Culture - Preliminary





 Ear, Left 


 


 09/09/19 11:54 Eye Culture - Preliminary





 Eye, Left 











Med Orders - Current: 


 Current Medications





Acetaminophen (Tylenol)  650 mg PO Q4H PRN


   PRN Reason: Pain (Mild 1-3)/fever


Gentamicin Sulfate (Garamycin 0.3% Ophth Soln)  0 ml EYELF TID [facility]


   Last Admin: 09/10/19 09:22 Dose:  1 drop


Heparin Sodium (Porcine) (Heparin Sodium)  5,000 units SUBCUT Q8HR [facility]


   Last Admin: 09/10/19 06:19 Dose:  5,000 units


Piperacillin Sod/Tazobactam (Sod 3.375 gm/ Sodium Chloride)  100 mls @ 200 mls/

hr IV Q6HR [facility]


   Last Infusion: 09/10/19 06:48 Dose:  Infused


Vancomycin HCl 1.25 gm/ Sodium (Chloride)  250 mls @ 166.667 mls/hr IV Q8H [facility]


   Last Admin: 09/10/19 04:25 Dose:  166.667 mls/hr


Ibuprofen (Motrin)  600 mg PO Q6H PRN


   PRN Reason: Pain (moderate 4-6)


   Last Admin: 09/10/19 04:29 Dose:  600 mg


Ondansetron HCl (Zofran)  4 mg IVPUSH Q6H PRN


   PRN Reason: Nausea/Vomiting


Ondansetron HCl (Zofran Odt)  4 mg PO Q6H PRN


   PRN Reason: nausea, able to take PO


Oxycodone/Acetaminophen (Percocet 325-5 Mg)  1 tab PO Q4H PRN


   PRN Reason: Pain (severe 7-10)


Sodium Chloride (Saline Flush)  10 ml FLUSH ASDIRECTED PRN


   PRN Reason: Keep Vein Open


   Last Admin: 09/10/19 04:19 Dose:  10 ml


Vancomycin HCl (Pharmacy To Dose - Vancomycin)  0 dose .XX ASDIRECTED CHARLES


Zolpidem Tartrate (Ambien)  5 mg PO BEDTIME PRN


   PRN Reason: Sleep


   Last Admin: 09/09/19 22:39 Dose:  5 mg





Discontinued Medications





Diphenhydramine HCl (Benadryl)  25 mg IVPUSH ONETIME ONE


   Stop: 09/09/19 11:59


   Last Admin: 09/09/19 12:28 Dose:  25 mg


Gentamicin Sulfate (Garamycin 0.3% Ophth Soln)  1 ml EYELF ONETIME ONE


   Stop: 09/09/19 12:05


   Last Admin: 09/09/19 12:26 Dose:  1 ml


Sodium Chloride (Normal Saline)  1,000 mls @ 999 mls/hr IV .BOLUS ONE


   Stop: 09/09/19 12:58


   Last Infusion: 09/09/19 16:41 Dose:  Infused


Vancomycin HCl 1,500 mg/ (Sodium Chloride)  500 mls @ 333.333 mls/hr IV ONETIME 

ONE


   Stop: 09/09/19 13:28


   Last Infusion: 09/09/19 16:40 Dose:  Infused


Piperacillin Sod/Tazobactam (Sod 3.375 gm/ Sodium Chloride)  100 mls @ 200 mls/

hr IV ONETIME ONE


   Stop: 09/09/19 14:04


   Last Admin: 09/09/19 16:40 Dose:  Not Given


Vancomycin HCl 1.25 gm/ Sodium (Chloride)  250 mls @ 166.667 mls/hr IV Q8HR [facility]


Iopamidol (Isovue-300 (61%))  100 ml IVPUSH ONETIME ONE


   Stop: 09/09/19 12:17


   Last Admin: 09/09/19 12:45 Dose:  100 ml


Mupirocin (Bactroban Oint)  15 gm TOP ONETIME ONE


   Stop: 09/09/19 12:05


   Last Admin: 09/09/19 12:24 Dose:  15 gm











- Exam


General: Alert, Oriented


HEENT: Other (Left facial swelling with small about 1 cm fluctuating abscess 

with pussy drainage)


Neck: Supple


Lungs: Clear to Auscultation, Normal Respiratory Effort


Cardiovascular: Regular Rate, Regular Rhythm


GI/Abdominal Exam: Normal Bowel Sounds, Soft, Non-Tender


Extremities: No Pedal Edema


Skin: Warm, Dry


Neurological: No New Focal Deficit


Psy/Mental Status: Alert, Normal Affect, Normal Mood





- Problem List & Annotations


(1) Facial cellulitis


SNOMED Code(s): 509648435


   Code(s): L03.211 - CELLULITIS OF FACE   Status: Acute   Current Visit: No   





- Problem List Review


Problem List Initiated/Reviewed/Updated: Yes





- My Orders


Last 24 Hours: 


My Active Orders





09/09/19 14:25


Oxygen Therapy [RC] PRN 


Up With Assistance [RC] ASDIRECTED 


VTE/DVT Education [RC] PER UNIT ROUTINE 


Vital Signs [RC] 07,11,15,19,23,03 


Acetaminophen [Tylenol]   650 mg PO Q4H PRN 


Acetaminophen/oxyCODONE [Percocet 325-5 MG]   1 tab PO Q4H PRN 


Ibuprofen [Motrin]   600 mg PO Q6H PRN 


Ondansetron [Zofran ODT]   4 mg PO Q6H PRN 


Ondansetron [Zofran]   4 mg IVPUSH Q6H PRN 


Zolpidem [Ambien]   5 mg PO BEDTIME PRN 


Resuscitation Status Routine 





09/09/19 14:30


Pharmacy to Dose - Vancomycin   0 dose .XX ASDIRECTED 





09/09/19 18:00


Piperacillin/Tazobactam [Zosyn] 3.375 gm   Sodium Chloride 0.9% [Normal Saline] 

100 ml IV Q6HR 





09/09/19 20:00


Vancomycin 1.25 gm   Sodium Chloride 0.9% [Normal Saline] 250 ml IV Q8H 





09/09/19 21:00


Gentamicin [Garamycin 0.3% Ophth Soln]   0 ml EYELF TID 





09/09/19 22:00


Heparin Sodium   5,000 units SUBCUT Q8HR 





09/09/19 Dinner


Regular Diet [DIET] 





09/10/19 19:30


VANCOMYCIN TROUGH [CHEM] Timed 





09/11/19 05:15


BASIC METABOLIC PANEL,BMP [CHEM] AM 


CBC WITH AUTO DIFF [HEME] AM 














- Plan


Plan:: 








44-year-old presented with left facial redness, swelling, 1 x 1 cm abscess





Facial cellulitis with small abscess


At this point the abscess probably can be treated with medications


It is draining well I do not think that the further surgical I&D is necessary


We'll monitor closely for needs surgical drainage


Pending blood culture, culture from drainage


Treat empirically with vancomycin and Zosyn





History of drug use with amphetamine, marijuana


Follow for potential withdrawal symptoms





History of present diabetes


Today had normal fasting blood sugar in the morning








DVT prophylaxis with subcutaneous heparin

## 2019-09-11 NOTE — PCM.PN
- General Info


Date of Service: 09/11/19


Admission Dx/Problem (Free Text): 


 Admission Diagnosis/Problem





Admission Diagnosis/Problem      Cellulitis and abscess of face








Subjective Update: 





facial cellulitis improving, swelling and redness improved


no painful distress


no visual disturbance


has chronic hearing loss, known, sees audiologist


Functional Status: Reports: Pain Controlled





- Review of Systems


General: Denies: Fever


HEENT: Reports: Other (chronic hearing loss, facial swelling)


Pulmonary: Reports: No Symptoms


Cardiovascular: Reports: No Symptoms


Gastrointestinal: Reports: No Symptoms


Genitourinary: Reports: No Symptoms


Musculoskeletal: Reports: No Symptoms


Skin: Reports: No Symptoms


Neurological: Reports: No Symptoms





- Patient Data


Vitals - Most Recent: 


 Last Vital Signs











Temp  37.6 C   09/11/19 08:09


 


Pulse  86   09/11/19 08:09


 


Resp  20   09/11/19 08:09


 


BP  123/67   09/11/19 08:09


 


Pulse Ox  95   09/11/19 08:09











Weight - Most Recent: 100.516 kg


I&O - Last 24 Hours: 


 Intake & Output











 09/10/19 09/11/19 09/11/19





 22:59 06:59 14:59


 


Intake Total 550 450 440


 


Balance 550 450 440











Lab Results Last 24 Hours: 


 Laboratory Results - last 24 hr











  09/10/19 09/11/19 09/11/19 Range/Units





  19:57 05:55 05:55 


 


WBC   11.6 H   (5.0-10.0)  10^3/uL


 


RBC   4.66   (4.6-6.2)  10^6/uL


 


Hgb   14.0   (14.0-18.0)  g/dL


 


Hct   41.0   (40.0-54.0)  %


 


MCV   88.0   ()  fL


 


MCH   30.0   (27.0-34.0)  pg


 


MCHC   34.1   (33.0-35.0)  g/dL


 


Plt Count   372   (150-450)  10^3/uL


 


Neut % (Auto)   63.0   (42.2-75.2)  %


 


Lymph % (Auto)   21.5   (20.5-50.1)  %


 


Mono % (Auto)   7.1   (2-8)  %


 


Eos % (Auto)   7.9 H   (1.0-3.0)  %


 


Baso % (Auto)   0.5   (0.0-1.0)  %


 


Sodium    139  (135-145)  mmol/L


 


Potassium    3.2 L  (3.6-5.0)  mmol/L


 


Chloride    104  (101-111)  mmol/L


 


Carbon Dioxide    26.0  (21.0-31.0)  mmol/L


 


Anion Gap    12.2  


 


BUN    4 L  (7-18)  mg/dL


 


Creatinine    0.7  (0.6-1.3)  mg/dL


 


Est Cr Clr Drug Dosing    134.18  mL/min


 


Estimated GFR (MDRD)    > 60  


 


Glucose    137 H  ()  mg/dL


 


Calcium    8.2 L  (8.4-10.2)  mg/dl


 


Vancomycin Trough  10.1    (10-15)  ug/ml











Da Results Last 24 Hours: 


 Microbiology











 09/09/19 11:55 Ear Culture - Final





 Ear, Left    Staphylococcus Aureus


 


 09/09/19 11:54 Eye Culture - Final





 Eye, Left    Staphylococcus Aureus


 


 09/09/19 12:15 Aerobic Blood Culture - Preliminary





 Blood - Venous - Lab Draw    NO GROWTH AFTER 1 DAY





 Anaerobic Blood Culture - Preliminary





    NO GROWTH AFTER 1 DAY


 


 09/09/19 12:08 Aerobic Blood Culture - Preliminary





 Blood - Venous    NO GROWTH AFTER 1 DAY





 Anaerobic Blood Culture - Preliminary





    NO GROWTH AFTER 1 DAY











Med Orders - Current: 


 Current Medications





Acetaminophen (Tylenol)  650 mg PO Q4H PRN


   PRN Reason: Pain (Mild 1-3)/fever


Gentamicin Sulfate (Garamycin 0.3% Ophth Soln)  0 ml EYELF TID [facility]


   Last Admin: 09/11/19 09:33 Dose:  1 drop


Heparin Sodium (Porcine) (Heparin Sodium)  5,000 units SUBCUT Q8HR [facility]


   Last Admin: 09/11/19 05:43 Dose:  5,000 units


Vancomycin HCl 1.25 gm/ Sodium (Chloride)  250 mls @ 166.667 mls/hr IV Q8H [facility]


   Last Admin: 09/11/19 04:04 Dose:  166.667 mls/hr


Ibuprofen (Motrin)  600 mg PO Q6H PRN


   PRN Reason: Pain (moderate 4-6)


   Last Admin: 09/10/19 04:29 Dose:  600 mg


Ondansetron HCl (Zofran)  4 mg IVPUSH Q6H PRN


   PRN Reason: Nausea/Vomiting


Ondansetron HCl (Zofran Odt)  4 mg PO Q6H PRN


   PRN Reason: nausea, able to take PO


Oxycodone/Acetaminophen (Percocet 325-5 Mg)  1 tab PO Q4H PRN


   PRN Reason: Pain (severe 7-10)


   Last Admin: 09/11/19 06:08 Dose:  1 tab


Sodium Chloride (Saline Flush)  10 ml FLUSH ASDIRECTED PRN


   PRN Reason: Keep Vein Open


   Last Admin: 09/11/19 05:57 Dose:  10 ml


Vancomycin HCl (Pharmacy To Dose - Vancomycin)  0 dose .XX ASDIRECTED [facility]


Zolpidem Tartrate (Ambien)  5 mg PO BEDTIME PRN


   PRN Reason: Sleep


   Last Admin: 09/10/19 21:34 Dose:  5 mg





Discontinued Medications





Diphenhydramine HCl (Benadryl)  25 mg IVPUSH ONETIME ONE


   Stop: 09/09/19 11:59


   Last Admin: 09/09/19 12:28 Dose:  25 mg


Gentamicin Sulfate (Garamycin 0.3% Ophth Soln)  1 ml EYELF ONETIME ONE


   Stop: 09/09/19 12:05


   Last Admin: 09/09/19 12:26 Dose:  1 ml


Sodium Chloride (Normal Saline)  1,000 mls @ 999 mls/hr IV .BOLUS ONE


   Stop: 09/09/19 12:58


   Last Infusion: 09/09/19 16:41 Dose:  Infused


Vancomycin HCl 1,500 mg/ (Sodium Chloride)  500 mls @ 333.333 mls/hr IV ONETIME 

ONE


   Stop: 09/09/19 13:28


   Last Infusion: 09/09/19 16:40 Dose:  Infused


Piperacillin Sod/Tazobactam (Sod 3.375 gm/ Sodium Chloride)  100 mls @ 200 mls/

hr IV ONETIME ONE


   Stop: 09/09/19 14:04


   Last Admin: 09/09/19 16:40 Dose:  Not Given


Piperacillin Sod/Tazobactam (Sod 3.375 gm/ Sodium Chloride)  100 mls @ 200 mls/

hr IV Q6HR [facility]


   Last Infusion: 09/11/19 06:47 Dose:  Infused


Vancomycin HCl 1.25 gm/ Sodium (Chloride)  250 mls @ 166.667 mls/hr IV Q8HR [facility]


Iopamidol (Isovue-300 (61%))  100 ml IVPUSH ONETIME ONE


   Stop: 09/09/19 12:17


   Last Admin: 09/09/19 12:45 Dose:  100 ml


Iopamidol (Isovue-300 (61%))  100 ml IVPUSH ONETIME ONE


   Stop: 09/11/19 09:33


   Last Admin: 09/11/19 09:43 Dose:  100 ml


Mupirocin (Bactroban Oint)  15 gm TOP ONETIME ONE


   Stop: 09/09/19 12:05


   Last Admin: 09/09/19 12:24 Dose:  15 gm


Potassium Chloride (Klor-Con 10)  40 meq PO ONETIME ONE


   Stop: 09/11/19 08:24


   Last Admin: 09/11/19 09:32 Dose:  40 meq











- Exam


General: Alert, Oriented


HEENT: Pupils Equal, Pupils Reactive


Neck: Supple


Lungs: Clear to Auscultation, Normal Respiratory Effort


Cardiovascular: Regular Rate, Regular Rhythm


GI/Abdominal Exam: Normal Bowel Sounds, Soft, Non-Tender


Extremities: Normal Inspection, Normal Range of Motion, Non-Tender


Physical Findings Comments:: 





redness, tenderness over left maxillary face, extending to external ear. 

Improving





- Problem List Review


Problem List Initiated/Reviewed/Updated: Yes





- My Orders


Last 24 Hours: 


My Active Orders





09/12/19 05:11


BASIC METABOLIC PANEL,BMP [CHEM] AM 


CBC W/O DIFF,HEMOGRAM [HEME] AM 





09/13/19 05:11


BASIC METABOLIC PANEL,BMP [CHEM] AM 


CBC W/O DIFF,HEMOGRAM [HEME] AM 





09/14/19 05:11


BASIC METABOLIC PANEL,BMP [CHEM] AM 


CBC W/O DIFF,HEMOGRAM [HEME] AM 














- Plan


Plan:: 








44-year-old presented with left facial redness, swelling, 1 x 1 cm abscess





Facial cellulitis with small abscess


Repeat CT of facial sinuses shows that abscess is smaller in volume


Continue broad spectrum abx


Plan for discharge later this week on oral abx





Hypokalemia


replenish potassium orally





DVT prophylaxis with subcutaneous heparin

## 2019-09-12 NOTE — PCM.PN
- General Info


Date of Service: 09/12/19


Admission Dx/Problem (Free Text): 


 Admission Diagnosis/Problem





Admission Diagnosis/Problem      Cellulitis and abscess of face








Subjective Update: 





facial cellulitis improving, swelling and redness improved


Swelling has punctum and expressing pus. Wound dressed this morning


no visual disturbance


has chronic hearing loss, known, sees audiologist





- Review of Systems


General: Reports: No Symptoms.  Denies: Fever


HEENT: Reports: Other (left external ear wound/abscess)


Pulmonary: Reports: No Symptoms


Cardiovascular: Reports: No Symptoms


Gastrointestinal: Reports: No Symptoms


Genitourinary: Reports: No Symptoms


Musculoskeletal: Reports: No Symptoms





- Patient Data


Vitals - Most Recent: 


 Last Vital Signs











Temp  36.4 C   09/12/19 07:26


 


Pulse  97   09/12/19 07:26


 


Resp  19   09/12/19 07:26


 


BP  116/71   09/12/19 07:26


 


Pulse Ox  97   09/12/19 07:26











Weight - Most Recent: 100.516 kg


I&O - Last 24 Hours: 


 Intake & Output











 09/11/19 09/12/19 09/12/19





 22:59 06:59 14:59


 


Intake Total 690 2513 360


 


Balance 690 2513 360











Lab Results Last 24 Hours: 


 Laboratory Results - last 24 hr











  09/12/19 09/12/19 Range/Units





  06:45 06:45 


 


WBC  7.0   (5.0-10.0)  10^3/uL


 


RBC  4.78   (4.6-6.2)  10^6/uL


 


Hgb  14.3   (14.0-18.0)  g/dL


 


Hct  42.0   (40.0-54.0)  %


 


MCV  87.9   ()  fL


 


MCH  29.9   (27.0-34.0)  pg


 


MCHC  34.0   (33.0-35.0)  g/dL


 


Plt Count  364   (150-450)  10^3/uL


 


Sodium   139  (135-145)  mmol/L


 


Potassium   4.0  (3.6-5.0)  mmol/L


 


Chloride   103  (101-111)  mmol/L


 


Carbon Dioxide   27.0  (21.0-31.0)  mmol/L


 


Anion Gap   13.0  


 


BUN   8  (7-18)  mg/dL


 


Creatinine   0.7  (0.6-1.3)  mg/dL


 


Est Cr Clr Drug Dosing   134.18  mL/min


 


Estimated GFR (MDRD)   > 60  


 


Glucose   100  ()  mg/dL


 


Calcium   8.8  (8.4-10.2)  mg/dl











Da Results Last 24 Hours: 


 Microbiology











 09/09/19 12:15 Aerobic Blood Culture - Preliminary





 Blood - Venous - Lab Draw    NO GROWTH AFTER 2 DAYS





 Anaerobic Blood Culture - Preliminary





    NO GROWTH AFTER 2 DAYS


 


 09/09/19 12:08 Aerobic Blood Culture - Preliminary





 Blood - Venous    NO GROWTH AFTER 2 DAYS





 Anaerobic Blood Culture - Preliminary





    NO GROWTH AFTER 2 DAYS


 


 09/09/19 11:55 Ear Culture - Final





 Ear, Left    Staphylococcus Aureus


 


 09/09/19 11:54 Eye Culture - Final





 Eye, Left    Staphylococcus Aureus











Med Orders - Current: 


 Current Medications





Acetaminophen (Tylenol)  650 mg PO Q4H PRN


   PRN Reason: Pain (Mild 1-3)/fever


Gentamicin Sulfate (Garamycin 0.3% Ophth Soln)  0 ml EYELF TID [facility]


   Last Admin: 09/12/19 09:58 Dose:  1 drop


Heparin Sodium (Porcine) (Heparin Sodium)  5,000 units SUBCUT Q8HR [facility]


   Last Admin: 09/12/19 05:53 Dose:  5,000 units


Vancomycin HCl 1.25 gm/ Sodium (Chloride)  250 mls @ 166.667 mls/hr IV Q8H [facility]


   Last Admin: 09/12/19 03:40 Dose:  166.667 mls/hr


Sodium Chloride (Normal Saline)  1,000 mls @ 100 mls/hr IV ASDIRECTED [facility]


   Last Admin: 09/12/19 01:35 Dose:  100 mls/hr


Ibuprofen (Motrin)  600 mg PO Q6H PRN


   PRN Reason: Pain (moderate 4-6)


   Last Admin: 09/10/19 04:29 Dose:  600 mg


Ondansetron HCl (Zofran)  4 mg IVPUSH Q6H PRN


   PRN Reason: Nausea/Vomiting


Ondansetron HCl (Zofran Odt)  4 mg PO Q6H PRN


   PRN Reason: nausea, able to take PO


Oxycodone/Acetaminophen (Percocet 325-5 Mg)  1 tab PO Q4H PRN


   PRN Reason: Pain (severe 7-10)


   Last Admin: 09/11/19 06:08 Dose:  1 tab


Sodium Chloride (Saline Flush)  10 ml FLUSH ASDIRECTED PRN


   PRN Reason: Keep Vein Open


   Last Admin: 09/11/19 05:57 Dose:  10 ml


Vancomycin HCl (Pharmacy To Dose - Vancomycin)  0 dose .XX ASDIRECTED [facility]


Zolpidem Tartrate (Ambien)  5 mg PO BEDTIME PRN


   PRN Reason: Sleep


   Last Admin: 09/11/19 21:06 Dose:  5 mg





Discontinued Medications





Diphenhydramine HCl (Benadryl)  25 mg IVPUSH ONETIME ONE


   Stop: 09/09/19 11:59


   Last Admin: 09/09/19 12:28 Dose:  25 mg


Gentamicin Sulfate (Garamycin 0.3% Ophth Soln)  1 ml EYELF ONETIME ONE


   Stop: 09/09/19 12:05


   Last Admin: 09/09/19 12:26 Dose:  1 ml


Sodium Chloride (Normal Saline)  1,000 mls @ 999 mls/hr IV .BOLUS ONE


   Stop: 09/09/19 12:58


   Last Infusion: 09/09/19 16:41 Dose:  Infused


Vancomycin HCl 1,500 mg/ (Sodium Chloride)  500 mls @ 333.333 mls/hr IV ONETIME 

ONE


   Stop: 09/09/19 13:28


   Last Infusion: 09/09/19 16:40 Dose:  Infused


Piperacillin Sod/Tazobactam (Sod 3.375 gm/ Sodium Chloride)  100 mls @ 200 mls/

hr IV ONETIME ONE


   Stop: 09/09/19 14:04


   Last Admin: 09/09/19 16:40 Dose:  Not Given


Piperacillin Sod/Tazobactam (Sod 3.375 gm/ Sodium Chloride)  100 mls @ 200 mls/

hr IV Q6HR [facility]


   Last Infusion: 09/11/19 06:47 Dose:  Infused


Vancomycin HCl 1.25 gm/ Sodium (Chloride)  250 mls @ 166.667 mls/hr IV Q8HR [facility]


Iopamidol (Isovue-300 (61%))  100 ml IVPUSH ONETIME ONE


   Stop: 09/09/19 12:17


   Last Admin: 09/09/19 12:45 Dose:  100 ml


Iopamidol (Isovue-300 (61%))  100 ml IVPUSH ONETIME ONE


   Stop: 09/11/19 09:33


   Last Admin: 09/11/19 09:43 Dose:  100 ml


Mupirocin (Bactroban Oint)  15 gm TOP ONETIME ONE


   Stop: 09/09/19 12:05


   Last Admin: 09/09/19 12:24 Dose:  15 gm


Potassium Chloride (Klor-Con 10)  40 meq PO ONETIME ONE


   Stop: 09/11/19 08:24


   Last Admin: 09/11/19 09:32 Dose:  40 meq











- Exam


General: Alert, Oriented


HEENT: Pupils Equal, Pupils Reactive


Lungs: Clear to Auscultation, Normal Respiratory Effort


Cardiovascular: Regular Rate, Regular Rhythm


GI/Abdominal Exam: Normal Bowel Sounds, Soft, Non-Tender, No Organomegaly


Extremities: Normal Inspection, Normal Range of Motion, Non-Tender





- Problem List Review


Problem List Initiated/Reviewed/Updated: Yes





- My Orders


Last 24 Hours: 


My Active Orders





09/11/19 11:15


Sodium Chloride 0.9% [Normal Saline] 1,000 ml IV ASDIRECTED 





09/13/19 05:11


BASIC METABOLIC PANEL,BMP [CHEM] AM 


CBC W/O DIFF,HEMOGRAM [HEME] AM 





09/14/19 05:11


BASIC METABOLIC PANEL,BMP [CHEM] AM 


CBC W/O DIFF,HEMOGRAM [HEME] AM 














- Plan


Plan:: 








44-year-old presented with left facial redness, swelling, 1 x 1 cm abscess





Facial cellulitis with small abscess


Repeat CT of facial sinuses shows that abscess is smaller in volume


Continue broad spectrum abx


Plan for discharge later this week on oral abx


Dress ext ear wound, Kerlix, wet to dry





Hypokalemia, resolved





DVT prophylaxis with subcutaneous heparin

## 2019-09-13 NOTE — [email or web address]
**Discharge Summary





- Hospital Course


Free Text/Narrative:: 





34-year-old with history of pre-diabetes, IVDU who presents with left sided 

facial swelling associated with drainage from in front of the left ear. 


Wound swab grew S. aureus. CT scan showed facial cellulitis and abscess


Was treated with IV abx for four days with improvement. 


Repeat CT scan showed absess had reduced in size


Will discharge on 10 days of Doxycyline and Keflex


Follow up with PCP


Has some chronic hearing loss and follows with audiologist. 





Diagnosis: Stroke: No


Modified Nottingham Scale: No Symptoms at All


Modified Nottingham Scale Score: 0





- Discharge Data


Discharge Date: 09/13/19


Discharge Disposition: Home, Self-Care 01


Condition: Good





- Referral to Home Health


Primary Care Physician: 


PCP Unobtainable








- Patient Instructions


Diet: Usual Diet as Tolerated


Activity: As Tolerated


Wound/Incision Care: Keep Operative Site/Wound Site Clean and Dry





- Discharge Plan


*PRESCRIPTION DRUG MONITORING PROGRAM REVIEWED*: No


*COPY OF PRESCRIPTION DRUG MONITORING REPORT IN PATIENT CRESENCIO: No


Prescriptions/Med Rec: 


cephALEXin [Keflex] 500 mg PO Q6HR 10 Days #40 cap


Doxycycline [Vibramycin] 100 mg PO BID 10 Days #20 cap


Gentamicin [Garamycin 0.3% Ophth Soln] 0 ml EYELF TID 10 Days #1 bottle


Home Medications: 


 Home Meds





Doxycycline [Vibramycin] 100 mg PO BID 10 Days #20 cap 09/13/19 [Rx]


Gentamicin [Garamycin 0.3% Ophth Soln] 0 ml EYELF TID 10 Days #1 bottle 09/13/ 19 [Rx]


cephALEXin [Keflex] 500 mg PO Q6HR 10 Days #40 cap 09/13/19 [Rx]








Patient Handouts:  Skin Abscess, Easy-to-Read, Doxycycline tablets or capsules, 

Cellulitis, Adult, Easy-to-Read, Cephalexin tablets or capsules


Referrals: 


PCP,Unobtain [Primary Care Provider] - 





- Discharge Summary/Plan Comment


DC Time >30 min.: Yes





- General Info


Date of Service: 09/13/19


Admission Dx/Problem (Free Text: 


 Admission Diagnosis/Problem





Admission Diagnosis/Problem      Cellulitis and abscess of face








Subjective Update: 





facial cellulitis improving, swelling and redness improved


no visual disturbance


has chronic hearing loss, known, sees audiologist





- Review of Systems


General: Denies: Fever


HEENT: Reports: Other (facial cellulitis, improved)


Pulmonary: Reports: No Symptoms


Cardiovascular: Reports: No Symptoms


Gastrointestinal: Reports: No Symptoms


Genitourinary: Reports: No Symptoms


Musculoskeletal: Reports: No Symptoms


Neurological: Reports: No Symptoms





- Patient Data


Vitals - Most Recent: 


 Last Vital Signs











Temp  36.0 C   09/13/19 07:00


 


Pulse  93   09/13/19 07:00


 


Resp  18   09/13/19 07:00


 


BP  123/64   09/13/19 07:00


 


Pulse Ox  95   09/13/19 07:00











Weight - Most Recent: 100.516 kg


I&O - Last 24 hours: 


 Intake & Output











 09/12/19 09/13/19 09/13/19





 22:59 06:59 14:59


 


Intake Total 770 250 360


 


Balance 770 250 360











Lab Results - Last 24 hrs: 


 Laboratory Results - last 24 hr











  09/12/19 09/13/19 09/13/19 Range/Units





  11:25 05:54 05:54 


 


WBC   7.3   (5.0-10.0)  10^3/uL


 


RBC   5.21   (4.6-6.2)  10^6/uL


 


Hgb   15.3   (14.0-18.0)  g/dL


 


Hct   45.7   (40.0-54.0)  %


 


MCV   87.7   ()  fL


 


MCH   29.4   (27.0-34.0)  pg


 


MCHC   33.5   (33.0-35.0)  g/dL


 


Plt Count   387   (150-450)  10^3/uL


 


Sodium    139  (135-145)  mmol/L


 


Potassium    4.1  (3.6-5.0)  mmol/L


 


Chloride    102  (101-111)  mmol/L


 


Carbon Dioxide    27.0  (21.0-31.0)  mmol/L


 


Anion Gap    14.1  


 


BUN    12  (7-18)  mg/dL


 


Creatinine    0.7  (0.6-1.3)  mg/dL


 


Est Cr Clr Drug Dosing    134.18  mL/min


 


Estimated GFR (MDRD)    > 60  


 


Glucose    102  ()  mg/dL


 


Calcium    8.9  (8.4-10.2)  mg/dl


 


Vancomycin Trough  10.7    (10-15)  ug/ml











OSIEL Results - Last 24 hrs: 


 Microbiology











 09/09/19 12:15 Aerobic Blood Culture - Preliminary





 Blood - Venous - Lab Draw    NO GROWTH AFTER 3 DAYS





 Anaerobic Blood Culture - Preliminary





    NO GROWTH AFTER 3 DAYS


 


 09/09/19 12:08 Aerobic Blood Culture - Preliminary





 Blood - Venous    NO GROWTH AFTER 3 DAYS





 Anaerobic Blood Culture - Preliminary





    NO GROWTH AFTER 3 DAYS











Med Orders - Current: 


 Current Medications





Acetaminophen (Tylenol)  650 mg PO Q4H PRN


   PRN Reason: Pain (Mild 1-3)/fever


Cephalexin (Keflex)  500 mg PO Q6HR [facility]


Doxycycline Hyclate (Vibramycin)  100 mg PO BID [facility]


Gentamicin Sulfate (Garamycin 0.3% Ophth Soln)  0 ml EYELF TID [facility]


   Last Admin: 09/13/19 09:47 Dose:  1 drop


Heparin Sodium (Porcine) (Heparin Sodium)  5,000 units SUBCUT Q8HR [facility]


   Last Admin: 09/13/19 05:37 Dose:  5,000 units


Ibuprofen (Motrin)  600 mg PO Q6H PRN


   PRN Reason: Pain (moderate 4-6)


   Last Admin: 09/10/19 04:29 Dose:  600 mg


Ondansetron HCl (Zofran)  4 mg IVPUSH Q6H PRN


   PRN Reason: Nausea/Vomiting


Ondansetron HCl (Zofran Odt)  4 mg PO Q6H PRN


   PRN Reason: nausea, able to take PO


Oxycodone/Acetaminophen (Percocet 325-5 Mg)  1 tab PO Q4H PRN


   PRN Reason: Pain (severe 7-10)


   Last Admin: 09/11/19 06:08 Dose:  1 tab


Sodium Chloride (Saline Flush)  10 ml FLUSH ASDIRECTED PRN


   PRN Reason: Keep Vein Open


   Last Admin: 09/13/19 04:00 Dose:  10 ml


Zolpidem Tartrate (Ambien)  5 mg PO BEDTIME PRN


   PRN Reason: Sleep


   Last Admin: 09/12/19 19:55 Dose:  5 mg





Discontinued Medications





Diphenhydramine HCl (Benadryl)  25 mg IVPUSH ONETIME ONE


   Stop: 09/09/19 11:59


   Last Admin: 09/09/19 12:28 Dose:  25 mg


Gentamicin Sulfate (Garamycin 0.3% Ophth Soln)  1 ml EYELF ONETIME ONE


   Stop: 09/09/19 12:05


   Last Admin: 09/09/19 12:26 Dose:  1 ml


Sodium Chloride (Normal Saline)  1,000 mls @ 999 mls/hr IV .BOLUS ONE


   Stop: 09/09/19 12:58


   Last Infusion: 09/09/19 16:41 Dose:  Infused


Vancomycin HCl 1,500 mg/ (Sodium Chloride)  500 mls @ 333.333 mls/hr IV ONETIME 

ONE


   Stop: 09/09/19 13:28


   Last Infusion: 09/09/19 16:40 Dose:  Infused


Piperacillin Sod/Tazobactam (Sod 3.375 gm/ Sodium Chloride)  100 mls @ 200 mls/

hr IV ONETIME ONE


   Stop: 09/09/19 14:04


   Last Admin: 09/09/19 16:40 Dose:  Not Given


Piperacillin Sod/Tazobactam (Sod 3.375 gm/ Sodium Chloride)  100 mls @ 200 mls/

hr IV Q6HR [facility]


   Last Infusion: 09/11/19 06:47 Dose:  Infused


Vancomycin HCl 1.25 gm/ Sodium (Chloride)  250 mls @ 166.667 mls/hr IV Q8HR [facility]


Vancomycin HCl 1.25 gm/ Sodium (Chloride)  250 mls @ 166.667 mls/hr IV Q8H [facility]


   Last Admin: 09/13/19 04:00 Dose:  166.667 mls/hr


Sodium Chloride (Normal Saline)  1,000 mls @ 100 mls/hr IV ASDIRECTED [facility]


   Last Infusion: 09/12/19 10:59 Dose:  0 mls/hr


Iopamidol (Isovue-300 (61%))  100 ml IVPUSH ONETIME ONE


   Stop: 09/09/19 12:17


   Last Admin: 09/09/19 12:45 Dose:  100 ml


Iopamidol (Isovue-300 (61%))  100 ml IVPUSH ONETIME ONE


   Stop: 09/11/19 09:33


   Last Admin: 09/11/19 09:43 Dose:  100 ml


Mupirocin (Bactroban Oint)  15 gm TOP ONETIME ONE


   Stop: 09/09/19 12:05


   Last Admin: 09/09/19 12:24 Dose:  15 gm


Potassium Chloride (Klor-Con 10)  40 meq PO ONETIME ONE


   Stop: 09/11/19 08:24


   Last Admin: 09/11/19 09:32 Dose:  40 meq


Vancomycin HCl (Pharmacy To Dose - Vancomycin)  0 dose .XX ASDIRECTED [facility]











- Exam


General: Reports: Alert, Oriented


HEENT: Reports: Pupils Equal, Pupils Reactive


Neck: Reports: Supple


Lungs: Reports: Clear to Auscultation, Normal Respiratory Effort


Cardiovascular: Reports: Regular Rate, Regular Rhythm, No Murmurs


GI/Abdominal Exam: Normal Bowel Sounds, Soft, Non-Tender, No Organomegaly

## 2019-10-03 NOTE — EDM.PDOC
ED HPI GENERAL MEDICAL PROBLEM





- General


Chief Complaint: Lower Extremity Injury/Pain


Stated Complaint: LEFT ANKLE POSSIBLE FRACTURE PER PT


Time Seen by Provider: 10/03/19 19:20


Source of Information: Reports: Patient


History Limitations: Reports: No Limitations





- History of Present Illness


INITIAL COMMENTS - FREE TEXT/NARRATIVE: 





This 35 yo male patient reports to the ED with left ankle pain due to rolling 

his ankle this morning while taking out garbage. The patient reports increased 

pain throughout the day with ambulation. 


Onset: Today


Onset Date: 10/03/19


Onset Time: 09:00


Duration: Constant


Location: Reports: Lower Extremity, Left


Quality: Reports: Ache, Sharp


Severity: Severe


Improves with: Reports: None


Worsens with: Reports: None


Context: Reports: Other


Associated Symptoms: Reports: No Other Symptoms


Treatments PTA: Reports: Acetaminophen





- Related Data


 Allergies











Allergy/AdvReac Type Severity Reaction Status Date / Time


 


tramadol AdvReac Mild Nausea Verified 10/03/19 18:39











Home Meds: 


 Home Meds





Acetaminophen [Tylenol Extra Strength] 1,000 mg PO ASDIRECTED PRN 10/03/19 [

History]











Past Medical History


HEENT History: Reports: Hard of Hearing, Impaired Vision


Gastrointestinal History: Reports: None


Genitourinary History: Reports: Other (See Below)


Musculoskeletal History: Reports: Other (See Below)


Other Musculoskeletal History: broken hand


Neurological History: Reports: None


Endocrine/Metabolic History: Reports: Obesity/BMI 30+


Other Endocrine/Metabolic History: pre-diabetes


Hematologic History: Reports: None


Immunologic History: Reports: None


Oncologic (Cancer) History: Reports: None


Dermatologic History: Reports: Eczema





- Infectious Disease History


Infectious Disease History: Reports: Chicken Pox





- Past Surgical History


GI Surgical History: Reports: Appendectomy


Musculoskeletal Surgical History: Reports: Other (See Below)


Other Musculoskeletal Surgeries/Procedures:: hand surgery





Social & Family History





- Family History


Family Medical History: Noncontributory





- Tobacco Use


Smoking Status *Q: Never Smoker





- Caffeine Use


Caffeine Use: Reports: Coffee, Soda





Review of Systems





- Review of Systems


Review Of Systems: ROS reveals no pertinent complaints other than HPI.





ED EXAM, GENERAL





- Physical Exam


Exam: See Below


Exam Limited By: No Limitations


General Appearance: Alert, WD/WN, Moderate Distress


Eye Exam: Left Eye: Other (contusion (healing)), Bilateral Eye: EOMI, PERRL


Ears: Normal External Exam, Normal Canal, Hearing Grossly Normal, Normal TMs


Nose: Normal Inspection, Normal Mucosa, No Blood


Throat/Mouth: Normal Inspection, Normal Lips, Normal Teeth, Normal Gums, Normal 

Oropharynx, Normal Voice, No Airway Compromise


Head: Atraumatic, Normocephalic


Neck: Normal Inspection, Supple, Non-Tender, Full Range of Motion


Respiratory/Chest: No Respiratory Distress, Lungs Clear, Normal Breath Sounds, 

No Accessory Muscle Use, Chest Non-Tender


Cardiovascular: Normal Peripheral Pulses, Regular Rate, Rhythm, No Edema, No 

Gallop, No JVD, No Murmur, No Rub


GI/Abdominal: Normal Bowel Sounds, Soft, Non-Tender, No Organomegaly, No 

Distention, No Abnormal Bruit, No Mass


 (Male) Exam: Deferred


Rectal (Males) Exam: Deferred


Back Exam: Normal Inspection, Full Range of Motion, NT


Extremities: Leg Pain (left ankle pain and swelling)


Neurological: Alert, Oriented, CN II-XII Intact, Normal Cognition, Normal Gait, 

Normal Reflexes, No Motor/Sensory Deficits


Psychiatric: Normal Affect, Normal Mood


Skin Exam: Warm, Dry, Intact, Normal Color, No Rash


Lymphatic: No Adenopathy





ED TRAUMA EXTREMITY PROCEDURES





- Splinting


  ** Left Lower Extremity


Splint Site: Left ankle (Tibia/Fibula)


Pre-Procedure NV Status: Normal


Post-Procedure NV Status: Normal


Splint Material: Fiberglass


Splint Design: Stirrup, Posterior


Applied & Form Fitted By: Provider


Provider Post-Splint Application NV Check: NV Status Normal, Good Position


Complications: No





Course





- Vital Signs


Last Recorded V/S: 


 Last Vital Signs











Temp  37.6 C   10/03/19 18:34


 


Pulse  117 H  10/03/19 18:34


 


Resp  18   10/03/19 18:34


 


BP  161/93 H  10/03/19 18:34


 


Pulse Ox  99   10/03/19 18:34














- Orders/Labs/Meds


Orders: 


 Active Orders 24 hr











 Category Date Time Status


 


 Ankle Min 3V Lt [CR] Urgent Exams  10/03/19 18:49 Taken


 


 Ankle wo Cont Lt [CT] Urgent Exams  10/03/19 20:05 Ordered


 


 DME for Discharge [COMM] Urgent Oth  10/03/19 20:07 Ordered











Meds: 


Medications














Discontinued Medications














Generic Name Dose Route Start Last Admin





  Trade Name Freq  PRN Reason Stop Dose Admin


 


Hydrocodone Bitart/Acetaminophen  1 tab  10/03/19 19:54  10/03/19 19:58





  [place] 325-10 Mg  PO  10/03/19 19:55  1 tab





  ONETIME ONE   Administration





     





     





     





     














Departure





- Departure


Time of Disposition: 20:56


Disposition: Home, Self-Care 01


Condition: Fair


Clinical Impression: 


Fracture of left tibia and fibula


Qualifiers:


 Encounter type: initial encounter Fracture type: closed Qualified Code(s): 

S82.202A - Unspecified fracture of shaft of left tibia, initial encounter for 

closed fracture; S82.402A - Unspecified fracture of shaft of left fibula, 

initial encounter for closed fracture








- Discharge Information


*PRESCRIPTION DRUG MONITORING PROGRAM REVIEWED*: Not Applicable


*COPY OF PRESCRIPTION DRUG MONITORING REPORT IN PATIENT CRESENCIO: Not Applicable


Instructions:  Cast or Splint Care, Adult, Easy-to-Read, Pain Medicine 

Instructions, Easy-to-Read, Crutch Use, Adult, Easy-to-Read, Tibial Fracture, 

Adult, Easy-to-Read


Forms:  ED Department Discharge


Care Plan Goals: 


The patient was advised of the examination and x-ray results during the visit. 

During the visit, a consult call was placed to Dr. Preston (Orthopedics with 

[facility] in [place]). Dr. Preston requested that the patient be splinted, 

remain non weight bearing on the left ankle and follow-up with him next Tuesday 

(10/8/19). The patient should call [facility] at [phone]  to set 

up his appointment for 10/8/19. The patient was given an oral dose of Norco 

while in the ED. The patient was discharged with a script for Norco (10/325) #8 

to take 1 by mouth every 6 hours as needed for pain. The patient was encouraged 

to rest, ice and elevate his left ankle. If the patient has any additional 

symptoms or concerns, the patient should either contact [facility], his 

primary care facility or return to the emergency department. 





- My Orders


Last 24 Hours: 


My Active Orders





10/03/19 18:49


Ankle Min 3V Lt [CR] Urgent 





10/03/19 20:05


Ankle wo Cont Lt [CT] Urgent 





10/03/19 20:07


DME for Discharge [COMM] Urgent 














- Assessment/Plan


Last 24 Hours: 


My Active Orders





10/03/19 18:49


Ankle Min 3V Lt [CR] Urgent 





10/03/19 20:05


Ankle wo Cont Lt [CT] Urgent 





10/03/19 20:07


DME for Discharge [COMM] Urgent

## 2020-03-27 NOTE — EDM.PDOC
ED HPI GENERAL MEDICAL PROBLEM





- General


Chief Complaint: ENT Problem


Stated Complaint: SWELLING TO THE RIGHT SIDE OF FACE


Time Seen by Provider: 03/27/20 22:18


Source of Information: Reports: Patient


History Limitations: Reports: No Limitations





- History of Present Illness


INITIAL COMMENTS - FREE TEXT/NARRATIVE: 





Dx with stye to right eye started on augmentin yesterday, more swollen tonight.


  ** Right Face/Facial


Pain Score (Numeric/FACES): 4





- Related Data


 Allergies











Allergy/AdvReac Type Severity Reaction Status Date / Time


 


tramadol AdvReac Mild Nausea Verified 10/03/19 18:39











Home Meds: 


 Home Meds





Acetaminophen [Tylenol Extra Strength] 1,000 mg PO ASDIRECTED PRN 10/03/19 [

History]











Past Medical History


HEENT History: Reports: Hard of Hearing, Impaired Vision


Gastrointestinal History: Reports: None


Genitourinary History: Reports: Other (See Below)


Musculoskeletal History: Reports: Other (See Below)


Other Musculoskeletal History: broken hand


Neurological History: Reports: None


Endocrine/Metabolic History: Reports: Obesity/BMI 30+


Other Endocrine/Metabolic History: pre-diabetes


Hematologic History: Reports: None


Immunologic History: Reports: None


Oncologic (Cancer) History: Reports: None


Dermatologic History: Reports: Eczema





- Infectious Disease History


Infectious Disease History: Reports: Chicken Pox





- Past Surgical History


GI Surgical History: Reports: Appendectomy


Musculoskeletal Surgical History: Reports: Other (See Below)


Other Musculoskeletal Surgeries/Procedures:: hand surgery





Social & Family History





- Family History


Family Medical History: Noncontributory





- Tobacco Use


Smoking Status *Q: Current Status Unknown





- Caffeine Use


Caffeine Use: Reports: Soda





- Recreational Drug Use


Recreational Drug Use: Yes


Drug Use in Last 12 Months: Yes


Recreational Drug Type: Reports: Marijuana/Hashish, Methamphetamine


Recreational Drug Use Frequency: Daily





ED ROS GENERAL





- Review of Systems


Review Of Systems: Comprehensive ROS is negative, except as noted in HPI.





ED EXAM, GENERAL





- Physical Exam


Exam: See Below


Exam Limited By: No Limitations


General Appearance: Alert, WD/WN, Mild Distress, Other (discomfort)


Eye Exam: Right Eye: Other (right stye infection, upper lid erythema swolen, no 

drainage noted)


Ears: Hearing Grossly Normal


Throat/Mouth: Normal Voice, No Airway Compromise


Head: Atraumatic


Neck: Non-Tender, Full Range of Motion


Respiratory/Chest: No Respiratory Distress


Cardiovascular: Regular Rate, Rhythm


GI/Abdominal: Soft, Non-Tender


Neurological: Alert, Oriented, Normal Cognition, Normal Gait, No Motor/Sensory 

Deficits


Psychiatric: Flat Affect


Skin Exam: Warm, Dry, Normal Color


Lymphatic: No Adenopathy





Course





- Vital Signs


Last Recorded V/S: 


 Last Vital Signs











Temp  36.8 C   03/27/20 22:17


 


Pulse  94   03/27/20 22:17


 


Resp  20   03/27/20 22:17


 


BP  153/95 H  03/27/20 22:17


 


Pulse Ox  96   03/27/20 22:17














- Orders/Labs/Meds


Meds: 


Medications














Discontinued Medications














Generic Name Dose Route Start Last Admin





  Trade Name Freq  PRN Reason Stop Dose Admin


 


Clindamycin Phosphate 900 mg/  106 mls @ 200 mls/hr  03/27/20 22:18  03/27/20 22

:26





  Sodium Chloride  IV  03/27/20 22:49  200 mls/hr





  ONETIME ONE   Administration





     





     





     





     














Departure





- Departure


Time of Disposition: 23:00


Disposition: Home, Self-Care 01


Condition: Good


Clinical Impression: 


Stye


Qualifiers:


 Laterality: right Eyelid: upper Qualified Code(s): H00.011 - Hordeolum 

externum right upper eyelid








- Discharge Information


Instructions:  Stye


Forms:  ED Department Discharge


Additional Instructions: 


1) keep eye clean


2) don't rub eye


3) follow up with clinic


4) stop augmentin





rx given;


clindamycin 300mg qid x 40





Sepsis Event Note





- Evaluation


Sepsis Screening Result: No Definite Risk





- Focused Exam


Vital Signs: 


 Vital Signs











  Temp Pulse Resp BP Pulse Ox


 


 03/27/20 22:17  36.8 C  94  20  153/95 H  96











Date Exam was Performed: 03/28/20


Time Exam was Performed: 06:21

## 2021-10-24 NOTE — EDM.PDOC
ED HPI GENERAL MEDICAL PROBLEM





- General


Chief Complaint: ENT Problem


Stated Complaint: HEARING AID PIECE STUCK DEEP IN EAR


Time Seen by Provider: 10/24/21 19:52


Source of Information: Reports: Patient, RN


History Limitations: Reports: No Limitations





- History of Present Illness


INITIAL COMMENTS - FREE TEXT/NARRATIVE: 





ED with c/o piece of hearing aide stuck in left ear for past 1/2 hour. 





- Related Data


                                    Allergies











Allergy/AdvReac Type Severity Reaction Status Date / Time


 


tramadol AdvReac Mild Nausea Verified 10/03/19 18:39











Home Meds: 


                                    Home Meds





Acetaminophen [Tylenol Extra Strength] 1,000 mg PO ASDIRECTED PRN 10/03/19 

[History]











Past Medical History


HEENT History: Reports: Hard of Hearing, Impaired Vision


Cardiovascular History: Reports: Hypertension


Gastrointestinal History: Reports: None


Genitourinary History: Reports: Other (See Below)


Musculoskeletal History: Reports: Other (See Below)


Other Musculoskeletal History: broken hand


Neurological History: Reports: None


Psychiatric History: Reports: Anxiety, Depression


Endocrine/Metabolic History: Reports: Obesity/BMI 30+


Other Endocrine/Metabolic History: pre-diabetes


Hematologic History: Reports: None


Immunologic History: Reports: None


Oncologic (Cancer) History: Reports: None


Dermatologic History: Reports: Eczema





- Infectious Disease History


Infectious Disease History: Reports: Chicken Pox





- Past Surgical History


GI Surgical History: Reports: Appendectomy


Musculoskeletal Surgical History: Reports: Other (See Below)


Other Musculoskeletal Surgeries/Procedures:: hand surgery





Social & Family History





- Family History


Family Medical History: No Pertinent Family History





- Caffeine Use


Caffeine Use: Reports: Soda





ED ROS ENT





- Review of Systems


Review Of Systems: Comprehensive ROS is negative, except as noted in HPI.





ED EXAM, ENT





- Physical Exam


Exam: See Below


Exam Limited By: No Limitations


General Appearance: Alert, Anxious


Eye Exam: Bilateral Eye: EOMI


Ears: Canal Foreign Body (FB visible left ear canal, easily removed with forcep.

 re-evaluation canal clear, no redness)


Nose: Normal Inspection


Mouth/Throat: Normal Inspection


Head: Atraumatic, Normocephalic


Neck: Normal Inspection


Respiratory/Chest: No Respiratory Distress


Neurological: Alert, Oriented


Psychiatric: Normal Affect





Course





- Vital Signs


Last Recorded V/S: 


                                Last Vital Signs











Temp  96.9 F   10/24/21 19:51


 


Pulse  110 H  10/24/21 19:51


 


Resp  20   10/24/21 19:51


 


BP  154/87 H  10/24/21 19:51


 


Pulse Ox  97   10/24/21 19:51














Departure





- Departure


Time of Disposition: 19:52


Disposition: Home, Self-Care 01


Condition: Good


Clinical Impression: 


Foreign body in ear


Qualifiers:


 Encounter type: initial encounter Laterality: left Qualified Code(s): T16.2XXA 

- Foreign body in left ear, initial encounter








- Discharge Information


*PRESCRIPTION DRUG MONITORING PROGRAM REVIEWED*: No


*COPY OF PRESCRIPTION DRUG MONITORING REPORT IN PATIENT CRESENCIO: No


Instructions:  Ear Foreign Body, Easy-to-Read


Referrals: 


PCP,None [Primary Care Provider] - 


Forms:  ED Department Discharge


Additional Instructions: 


Follow up clinic if redness swelling or severe pain











Sepsis Event Note (ED)





- Focused Exam


Vital Signs: 


                                   Vital Signs











  Temp Pulse Resp BP Pulse Ox


 


 10/24/21 19:51  96.9 F  110 H  20  154/87 H  97